# Patient Record
Sex: FEMALE | Race: BLACK OR AFRICAN AMERICAN | NOT HISPANIC OR LATINO | Employment: FULL TIME | URBAN - METROPOLITAN AREA
[De-identification: names, ages, dates, MRNs, and addresses within clinical notes are randomized per-mention and may not be internally consistent; named-entity substitution may affect disease eponyms.]

---

## 2017-02-07 ENCOUNTER — OFFICE VISIT (OUTPATIENT)
Dept: FAMILY MEDICINE | Facility: CLINIC | Age: 27
End: 2017-02-07
Payer: COMMERCIAL

## 2017-02-07 VITALS
BODY MASS INDEX: 47.09 KG/M2 | OXYGEN SATURATION: 100 % | TEMPERATURE: 98 F | HEART RATE: 116 BPM | SYSTOLIC BLOOD PRESSURE: 120 MMHG | WEIGHT: 293 LBS | HEIGHT: 66 IN | DIASTOLIC BLOOD PRESSURE: 70 MMHG | RESPIRATION RATE: 16 BRPM

## 2017-02-07 DIAGNOSIS — F41.9 ANXIETY: ICD-10-CM

## 2017-02-07 DIAGNOSIS — F41.9 ANXIETY: Primary | ICD-10-CM

## 2017-02-07 PROCEDURE — 99999 PR PBB SHADOW E&M-EST. PATIENT-LVL III: CPT | Mod: PBBFAC,,, | Performed by: FAMILY MEDICINE

## 2017-02-07 PROCEDURE — 99203 OFFICE O/P NEW LOW 30 MIN: CPT | Mod: S$GLB,,, | Performed by: FAMILY MEDICINE

## 2017-02-07 RX ORDER — FLUTICASONE PROPIONATE 50 MCG
SPRAY, SUSPENSION (ML) NASAL
Refills: 0 | COMMUNITY
Start: 2017-01-13 | End: 2017-02-07

## 2017-02-07 RX ORDER — FLUOXETINE HYDROCHLORIDE 20 MG/1
20 CAPSULE ORAL DAILY
Qty: 30 CAPSULE | Refills: 1 | Status: SHIPPED | OUTPATIENT
Start: 2017-02-07 | End: 2017-02-07 | Stop reason: SDUPTHER

## 2017-02-07 RX ORDER — AMOXICILLIN 875 MG/1
TABLET, FILM COATED ORAL
Refills: 0 | COMMUNITY
Start: 2016-11-22 | End: 2017-02-07

## 2017-02-07 RX ORDER — ETONOGESTREL AND ETHINYL ESTRADIOL .12; .015 MG/D; MG/D
INSERT, EXTENDED RELEASE VAGINAL
Refills: 12 | COMMUNITY
Start: 2016-11-11

## 2017-02-07 RX ORDER — HYDROXYZINE PAMOATE 25 MG/1
25 CAPSULE ORAL
COMMUNITY
End: 2018-02-22

## 2017-02-07 NOTE — MR AVS SNAPSHOT
[facility] Medicine  [address]  [place] [address]  Phone: [phone]  Fax: [phone]                  Lisa Martinez   2017 2:20 PM   Office Visit    Description:  Female : 1990   Provider:  Barrera Rosenberg MD   Department:  [facility]           Reason for Visit     Breast Pain           Diagnoses this Visit        Comments    Anxiety    -  Primary            To Do List           Goals (5 Years of Data)     None       These Medications        Disp Refills Start End    fluoxetine (PROZAC) 20 MG capsule 30 capsule 1 2017     Take 1 capsule (20 mg total) by mouth once daily. - Oral    Pharmacy: [id number] - [place] [address] AT [facility] #: [phone]         [facility] On Call     [facility] On Call Nurse Care Line -  Assistance  Registered nurses in the [facility] On Call Center provide clinical advisement, health education, appointment booking, and other advisory services.  Call for this free service at [phone].             Medications           Message regarding Medications     Verify the changes and/or additions to your medication regime listed below are the same as discussed with your clinician today.  If any of these changes or additions are incorrect, please notify your healthcare provider.        START taking these NEW medications        Refills    fluoxetine (PROZAC) 20 MG capsule 1    Sig: Take 1 capsule (20 mg total) by mouth once daily.    Class: Normal    Route: Oral      STOP taking these medications     amoxicillin (AMOXIL) 875 MG tablet TK 1 T PO Q 12 H TAT    diclofenac sodium (SOLARAZE) 3 % gel Apply topically 2 (two) times daily.      fluticasone (FLONASE) 50 mcg/actuation nasal spray SPRAY 1 SPRAY IEN BID OR AS AN ALTERNATIVE 2 SPRAYS IEN ONCE QD    ibuprofen (ADVIL,MOTRIN) 600 MG tablet Take 1 tablet (600 mg total) by mouth every 6 (six) hours as needed for Pain.     "omeprazole (PRILOSEC) 20 MG capsule Take 1 capsule (20 mg total) by mouth once daily.           Verify that the below list of medications is an accurate representation of the medications you are currently taking.  If none reported, the list may be blank. If incorrect, please contact your healthcare provider. Carry this list with you in case of emergency.           Current Medications     hydrOXYzine pamoate (VISTARIL) 25 MG Cap Take 25 mg by mouth. TAKE 1 CAPSULE BY MOUTH EVERY 4 TO 6 HOURS AS NEEDED FOR NAUSEA    fluoxetine (PROZAC) 20 MG capsule Take 1 capsule (20 mg total) by mouth once daily.    NUVARING 0.12-0.015 mg/24 hr vaginal ring INSERT 1 RING VAGINALLY UTD FOR 1 MONTH           Clinical Reference Information           Your Vitals Were     BP Pulse Temp Resp Height Weight    120/70 (BP Location: Left arm, Patient Position: Sitting, BP Method: Manual) 116 98.1 °F (36.7 °C) (Oral) 16 5' 6" (1.676 m) 135.5 kg (298 lb 11.6 oz)    Last Period SpO2 BMI          02/01/2017 (Exact Date) 100% 48.22 kg/m2        Blood Pressure          Most Recent Value    BP  120/70      Allergies as of 2/7/2017     Shellfish Containing Products    Shellfish Derived      Immunizations Administered on Date of Encounter - 2/7/2017     None      Instructions      Your Bodys Response to Anxiety    Normal anxiety is part of the bodys natural defense system. It's an alert to a threat that is unknown, vague, or comes from your own internal fears. While youre in this state, your feelings can range from a vague sense of worry to physical sensations such as a pounding heartbeat. These feelings make you want to react to the threat. An anxiety response is normal in many situations. But when you have an anxiety disorder, the same response can occur at the wrong times.  Anxiety can be helpful  Normal anxiety is a signal from your brain that warns you of a threat and is a normal response to help you prevent something or decrease the bad " effects of something you can't control. For example, anxiety is a normal response to situations that might damage your body, separate you from a loved one, or lose your job. The symptoms of anxiety can be physical and mental.  How does it feel?  At certain times, people with anxiety may have:  · Dizziness  · Muscle tension or pain  · Restlessness  · Sleeplessness  · Difficulty concentrating  · Racing heartbeat  · Fast breathing  · Shaking or trembling  · Stomachache  · Diarrhea  · Loss of energy  · Sweating  · Cold, clammy hands  · Chest pain  · Dry mouth  Anxiety can also be a problem  Anxiety can become a problem when it is difficult to control, occurs for months, and interferes with important parts of your life. With an anxiety disorder, your body has the response described above, but in inappropriate ways. The response a person has depends on the anxiety disorder he or she has. With some disorders, the anxiety is way out of proportion to the threat that triggers it. With others, anxiety may occur even when there isnt a clear threat or trigger.  Who does it affect?  Some people are more prone to persistent anxiety than others. It tends to run in families, and it affects more younger people than older people. But no age, race, or gender is immune to anxiety problems.  Anxiety can be treated  The good news is that the anxiety thats disrupting your life can be treated. Working with your doctor or other healthcare provider, you can develop skills to help you cope with anxiety. You can also gain the perspective you need to overcome your fears. Note: Good sources of support or guidance can be found at your local hospital, mental health clinic, or an employee assistance program.     If anxiety is wearing you down, here are some things you can do to cope:  · Keep in mind that you cant control everything about a situation. Change what you can and let the rest take its course.  · Exercise--its a great way to relieve  tension and help your body feel relaxed.  · Avoid caffeine and nicotine, which can make anxiety symptoms worse.  · Fight the temptation to turn to alcohol or unprescribed drugs for relief. They only make things worse in the long run.   Date Last Reviewed: 1/19/2015 © 2000-2016 [employer]. [address] [place] [address], [place] [address]. All rights reserved. This information is not intended as a substitute for professional medical care. Always follow your healthcare professional's instructions.        Anxiety Reaction  Anxiety is the feeling we all get when we think something bad might happen. It is a normal response to stress and usually causes only a mild reaction. When anxiety becomes more severe, it can interfere with daily life. In some cases, you may not even be aware of what it is youre anxious about. There may also be a genetic link or it may be a learned behavior in the home.  Both psychological and physical triggers cause stress reaction. It's often a response to fear or emotional stress, real or imagined. This stress may come from home, family, work, or social relationships.  During an anxiety reaction, you may feel:  · Helpless  · Nervous  · Depressed  · Irritable  Your body may show signs of anxiety in many ways. You may experience:  · Dry mouth  · Shakiness  · Dizziness  · Weakness  · Trouble breathing  · Breathing fast (hyperventilating)  · Chest pressure  · Sweating  · Headache  · Nausea  · Diarrhea  · Tiredness  · Inability to sleep  · Sexual problems  Home care  · Try to locate the sources of stress in your life. They may not be obvious. These may include:  ¨ Daily hassles of life (traffic jams, missed appointments, car troubles, etc.)  ¨ Major life changes, both good (new baby, job promotion) and bad (loss of job, loss of loved one)  ¨ Overload: feeling that you have too many responsibilities and can't take care of all of them at once  ¨ Feeling helpless, feeling that your problems are  beyond what youre able to solve  · Notice how your body reacts to stress. Learn to listen to your body signals. This will help you take action before the stress becomes severe.  · When you can, do something about the source of your stress. (Avoid hassles, limit the amount of change that happens in your life at one time and take a break when you feel overloaded).  · Unfortunately, many stressful situations can't be avoided. It is necessary to learn how to better manage stress. There are many proven methods that will reduce your anxiety. These include simple things like exercise, good nutrition and adequate rest. Also, there are certain techniques that are helpful:  ¨ Relaxation  ¨ Breathing exercises  ¨ Visualization  ¨ Biofeedback  ¨ Meditation  For more information about this, consult your doctor or go to a local bookstore and review the many books and tapes available on this subject.  Follow-up care  If you feel that your anxiety is not responding to self-help measures, contact your doctor or make an appointment with a counselor. You may need short-term psychological counseling and temporary medicine to help you manage stress.  Call 911  Call your healthcare provider right away if any of these occur:  · Trouble breathing  · Confusion  · Drowsiness or trouble wakening  · Fainting or loss of consciousness  · Rapid heart rate  · Seizure  · New chest pain that becomes more severe, lasts longer, or spreads into your shoulder, arm, neck, jaw, or back  When to seek medical advice  Call your healthcare provider right away if any of these occur:  · Your symptoms get worse  · Severe headache not relieved by rest and mild pain reliever  Date Last Reviewed: 9/29/2015  © 2029-6258 [employer]. [address] [place] [address], [place] [address]. All rights reserved. This information is not intended as a substitute for professional medical care. Always follow your healthcare professional's instructions.              Language Assistance Services     ATTENTION: Language assistance services are available, free of charge. Please call [phone].      ATENCIÓN: Si habla familia, tiene a salazar disposición servicios gratuitos de asistencia lingüística. Llame al [phone].     CHÚ Ý: N?u b?n nói Ti?ng Vi?t, có các d?ch v? h? tr? ngôn ng? mi?n phí dành cho b?n. G?i s? [phone].         [facility] complies with applicable Federal civil rights laws and does not discriminate on the basis of race, color, national origin, age, disability, or sex.

## 2017-02-07 NOTE — MEDICAL/APP STUDENT
Subjective:       Patient ID: Lisa Martinez is a 26 y.o. female.    Chief Complaint: Breast Pain (left breast off and on for past 2 months )    HPI   Complains of breast pain lasting for few seconds at a time in the upper outer quadrant and centrally. Has happened 4 times in the last two months. Most recently felt constant pain while doing laundry for a few seconds, also felt hot and had to sit down. Pain did not radiate, no SOB, no sweating. Feels symptoms become worse with worry and fixating on a possible illness. Meditates at night and does deep breathing exercises to relax. Recently engaged 9 months ago and has been planning wedding, feels like she is running short on time and may be the cause of some of her symptoms. Denies other symptoms     Was seen at [facility] for same complaint and weakness of left arm. Given clonazepam and symptoms went away.   Has intentional weight loss -32 lbs / 3.5 months  Has not been refitted for a new bra since    GAD7  Nervous every day last two weeks +3  Not been able to stop worrying +3  Worrying too much about different things +3  Trouble relaxing +1  Being so restless, that's it's hard to sit still +1  Being easily annoyed or irritable +2  Feeling afraid as if something awful might happen +3  Total = 16  Very difficult to deal with work, things at home, social interactions     No SOB, CP, STEEL, LOC    Review of Systems   Constitutional: Negative for chills, diaphoresis and fever.   Respiratory: Negative for cough, chest tightness, shortness of breath and wheezing.    Cardiovascular: Negative for chest pain and palpitations.   Gastrointestinal: Negative for constipation, diarrhea, nausea and vomiting.   Neurological: Negative for dizziness, syncope and light-headedness.       Objective:      Physical Exam   Constitutional: She is oriented to person, place, and time. She appears well-developed and well-nourished.   Cardiovascular: Regular rhythm.  Tachycardia present.     Pulmonary/Chest: Effort normal and breath sounds normal.   Neurological: She is alert and oriented to person, place, and time.   Psychiatric: Her mood appears anxious.   Tearful       Assessment:       No diagnosis found.    Plan:       Anxiety Disorder  She said she has a history of anxiety and had gone though some training in the past to deal with it. Is engaged and has been planning wedding. Felt within the last two months that she is running out of time and coincides with onset of symptoms. Went to [facility] for similar CC and given clonazepam and reports symptoms went away.     Tearful during evaluation  HOMERO-7 +16/21 - Severe.  - Rec Breathing exercising, taking breaks to settle self  - CBT  - Exercise  - Sertraline

## 2017-02-07 NOTE — PROGRESS NOTES
Subjective:       Patient ID: Lisa Martinez is a 26 y.o. female.    Chief Complaint: Breast Pain (left breast off and on for past 2 months )    HPI      Complains of central chest pain that radiates to the L side x 2 months that is intermittent, and lasts on the order of seconds. She has had 4 such episodes. Most recently felt chest pain while doing laundry for a few seconds, a/w sensation of 'feeling hot' and had to sit down. Her pain quickly resolved. Pt states that symptoms become worse with worry and fixating on a possible illness. Meditates at night and does deep breathing exercises to relax. She became engaged 9 months ago and has been planning wedding which is occuringin 2 months, which is a significant source of section.      She was seen at [facility] for same complaint and weakness of left arm. Given clonazepam and symptoms went away.   Has intentional weight loss -32 lbs / 3.5 months  Has not been refitted for a new bra since     GAD7  Nervous every day last two weeks +3  Not been able to stop worrying +3  Worrying too much about different things +3  Trouble relaxing +1  Being so restless, that's it's hard to sit still +1  Being easily annoyed or irritable +2  Feeling afraid as if something awful might happen +3  Total = 16  Very difficult to deal with work, things at home, social interactions      No SOB, CP, STEEL, LOC         Anxiety - pt has had anxiety in the past.         Pt has mom and sister with anxiety disorders.        No current outpatient prescriptions on file prior to visit.     No current facility-administered medications on file prior to visit.        Review of Systems   Constitutional: Negative for chills and fever.   Respiratory: Negative for shortness of breath and wheezing.        Objective:     Vitals:    02/07/17 1429   BP: 120/70   Pulse: (!) 116   Resp: 16   Temp: 98.1 °F (36.7 °C)        Physical Exam   Constitutional: She appears well-developed. No distress.   LUI POE:    Head: Normocephalic and atraumatic.   Eyes: Conjunctivae are normal. No scleral icterus.   Pulmonary/Chest: Effort normal.   Neurological: She is alert.   Skin: She is not diaphoretic.   Psychiatric: She has a normal mood and affect. Her behavior is normal.   Vitals reviewed.      Assessment:       1. Anxiety        Plan:       Lisa was seen today for breast pain.    Diagnoses and all orders for this visit:    Anxiety _ NEW to me, chronic problem    Pt counseled on potential avenues to treat their anxiety/depression including medication, CBT, and exercise.    Pt opted for trial of medications. I explained potential side effects including worsening mood or SI, and instructed the patient to stop the medication immediately and to contact our office if these sxs occur.    -  I also explained that SSRIs/SNRIs that are used to treat anxiety/depression, take up to 8 weeks for full efficacy and I encouraged compliance through this period.     - I also advised patient that once medication was started, that it should not be stopped abruptly. Pt asked to notify me if they want to stop themedication for any reason so that I can explain how to wean off of the medication safely.      Pt has opted for a trial of CBT. Pt was instructed to call their insurance company and request an appointment to be made for CBT as a referral is not needed.     Pt has opted for a trial of 3-5 days a week of cardio exercise for 20-30mins to help control their sxs.               Return in about 2 weeks (around 2/21/2017) for Anxiety.        Pt verbalized understanding and agreed with our plan.

## 2017-02-07 NOTE — PATIENT INSTRUCTIONS
Your Bodys Response to Anxiety    Normal anxiety is part of the bodys natural defense system. It's an alert to a threat that is unknown, vague, or comes from your own internal fears. While youre in this state, your feelings can range from a vague sense of worry to physical sensations such as a pounding heartbeat. These feelings make you want to react to the threat. An anxiety response is normal in many situations. But when you have an anxiety disorder, the same response can occur at the wrong times.  Anxiety can be helpful  Normal anxiety is a signal from your brain that warns you of a threat and is a normal response to help you prevent something or decrease the bad effects of something you can't control. For example, anxiety is a normal response to situations that might damage your body, separate you from a loved one, or lose your job. The symptoms of anxiety can be physical and mental.  How does it feel?  At certain times, people with anxiety may have:  · Dizziness  · Muscle tension or pain  · Restlessness  · Sleeplessness  · Difficulty concentrating  · Racing heartbeat  · Fast breathing  · Shaking or trembling  · Stomachache  · Diarrhea  · Loss of energy  · Sweating  · Cold, clammy hands  · Chest pain  · Dry mouth  Anxiety can also be a problem  Anxiety can become a problem when it is difficult to control, occurs for months, and interferes with important parts of your life. With an anxiety disorder, your body has the response described above, but in inappropriate ways. The response a person has depends on the anxiety disorder he or she has. With some disorders, the anxiety is way out of proportion to the threat that triggers it. With others, anxiety may occur even when there isnt a clear threat or trigger.  Who does it affect?  Some people are more prone to persistent anxiety than others. It tends to run in families, and it affects more younger people than older people. But no age, race, or gender is immune  to anxiety problems.  Anxiety can be treated  The good news is that the anxiety thats disrupting your life can be treated. Working with your doctor or other healthcare provider, you can develop skills to help you cope with anxiety. You can also gain the perspective you need to overcome your fears. Note: Good sources of support or guidance can be found at your local hospital, mental health clinic, or an employee assistance program.     If anxiety is wearing you down, here are some things you can do to cope:  · Keep in mind that you cant control everything about a situation. Change what you can and let the rest take its course.  · Exercise--its a great way to relieve tension and help your body feel relaxed.  · Avoid caffeine and nicotine, which can make anxiety symptoms worse.  · Fight the temptation to turn to alcohol or unprescribed drugs for relief. They only make things worse in the long run.   Date Last Reviewed: 1/19/2015  © 6322-2761 [employer]. [address] [place] [address]. All rights reserved. This information is not intended as a substitute for professional medical care. Always follow your healthcare professional's instructions.        Anxiety Reaction  Anxiety is the feeling we all get when we think something bad might happen. It is a normal response to stress and usually causes only a mild reaction. When anxiety becomes more severe, it can interfere with daily life. In some cases, you may not even be aware of what it is youre anxious about. There may also be a genetic link or it may be a learned behavior in the home.  Both psychological and physical triggers cause stress reaction. It's often a response to fear or emotional stress, real or imagined. This stress may come from home, family, work, or social relationships.  During an anxiety reaction, you may feel:  · Helpless  · Nervous  · Depressed  · Irritable  Your body may show signs of anxiety in many ways. You may  experience:  · Dry mouth  · Shakiness  · Dizziness  · Weakness  · Trouble breathing  · Breathing fast (hyperventilating)  · Chest pressure  · Sweating  · Headache  · Nausea  · Diarrhea  · Tiredness  · Inability to sleep  · Sexual problems  Home care  · Try to locate the sources of stress in your life. They may not be obvious. These may include:  ¨ Daily hassles of life (traffic jams, missed appointments, car troubles, etc.)  ¨ Major life changes, both good (new baby, job promotion) and bad (loss of job, loss of loved one)  ¨ Overload: feeling that you have too many responsibilities and can't take care of all of them at once  ¨ Feeling helpless, feeling that your problems are beyond what youre able to solve  · Notice how your body reacts to stress. Learn to listen to your body signals. This will help you take action before the stress becomes severe.  · When you can, do something about the source of your stress. (Avoid hassles, limit the amount of change that happens in your life at one time and take a break when you feel overloaded).  · Unfortunately, many stressful situations can't be avoided. It is necessary to learn how to better manage stress. There are many proven methods that will reduce your anxiety. These include simple things like exercise, good nutrition and adequate rest. Also, there are certain techniques that are helpful:  ¨ Relaxation  ¨ Breathing exercises  ¨ Visualization  ¨ Biofeedback  ¨ Meditation  For more information about this, consult your doctor or go to a local bookstore and review the many books and tapes available on this subject.  Follow-up care  If you feel that your anxiety is not responding to self-help measures, contact your doctor or make an appointment with a counselor. You may need short-term psychological counseling and temporary medicine to help you manage stress.  Call 911  Call your healthcare provider right away if any of these occur:  · Trouble  breathing  · Confusion  · Drowsiness or trouble wakening  · Fainting or loss of consciousness  · Rapid heart rate  · Seizure  · New chest pain that becomes more severe, lasts longer, or spreads into your shoulder, arm, neck, jaw, or back  When to seek medical advice  Call your healthcare provider right away if any of these occur:  · Your symptoms get worse  · Severe headache not relieved by rest and mild pain reliever  Date Last Reviewed: 9/29/2015 © 2000-2016 [employer]. [address] [place] [address]. All rights reserved. This information is not intended as a substitute for professional medical care. Always follow your healthcare professional's instructions.

## 2017-02-09 RX ORDER — FLUOXETINE HYDROCHLORIDE 20 MG/1
CAPSULE ORAL
Qty: 90 CAPSULE | Refills: 1 | Status: SHIPPED | OUTPATIENT
Start: 2017-02-09 | End: 2018-02-12 | Stop reason: SDUPTHER

## 2018-02-12 DIAGNOSIS — F41.9 ANXIETY: ICD-10-CM

## 2018-02-12 RX ORDER — FLUOXETINE HYDROCHLORIDE 20 MG/1
20 CAPSULE ORAL DAILY
Qty: 30 CAPSULE | Refills: 0 | Status: SHIPPED | OUTPATIENT
Start: 2018-02-12 | End: 2018-03-16

## 2018-02-12 RX ORDER — FLUOXETINE HYDROCHLORIDE 20 MG/1
20 CAPSULE ORAL DAILY
Qty: 30 CAPSULE | Refills: 0 | Status: SHIPPED | OUTPATIENT
Start: 2018-02-12 | End: 2018-02-12 | Stop reason: SDUPTHER

## 2018-02-12 NOTE — TELEPHONE ENCOUNTER
Prozac refill. Last refill.02/09/2017 LOV 02/07/2017    ----- Message from Katie Yip sent at 2/12/2018 12:22 PM CST -----  Contact: Self/[phone]  Refill:  fluoxetine (PROZAC) 20 MG capsule    Pharmacy:  [facility] [address] [place] [address] AT [facility]. Thank you.

## 2018-02-22 ENCOUNTER — TELEPHONE (OUTPATIENT)
Dept: FAMILY MEDICINE | Facility: CLINIC | Age: 28
End: 2018-02-22

## 2018-02-22 ENCOUNTER — OFFICE VISIT (OUTPATIENT)
Dept: FAMILY MEDICINE | Facility: CLINIC | Age: 28
End: 2018-02-22
Payer: COMMERCIAL

## 2018-02-22 ENCOUNTER — LAB VISIT (OUTPATIENT)
Dept: LAB | Facility: HOSPITAL | Age: 28
End: 2018-02-22
Attending: FAMILY MEDICINE
Payer: COMMERCIAL

## 2018-02-22 VITALS
OXYGEN SATURATION: 99 % | TEMPERATURE: 99 F | BODY MASS INDEX: 47.09 KG/M2 | RESPIRATION RATE: 16 BRPM | SYSTOLIC BLOOD PRESSURE: 120 MMHG | DIASTOLIC BLOOD PRESSURE: 70 MMHG | HEART RATE: 95 BPM | WEIGHT: 293 LBS | HEIGHT: 66 IN

## 2018-02-22 DIAGNOSIS — F41.9 ANXIETY: ICD-10-CM

## 2018-02-22 DIAGNOSIS — R22.1 NECK FULLNESS: Primary | ICD-10-CM

## 2018-02-22 DIAGNOSIS — E66.01 MORBID OBESITY WITH BMI OF 40.0-44.9, ADULT: ICD-10-CM

## 2018-02-22 DIAGNOSIS — R22.1 NECK FULLNESS: ICD-10-CM

## 2018-02-22 DIAGNOSIS — Z00.00 ANNUAL PHYSICAL EXAM: ICD-10-CM

## 2018-02-22 DIAGNOSIS — Z00.00 ANNUAL PHYSICAL EXAM: Primary | ICD-10-CM

## 2018-02-22 LAB
ALBUMIN SERPL BCP-MCNC: 3.7 G/DL
ALP SERPL-CCNC: 94 U/L
ALT SERPL W/O P-5'-P-CCNC: 12 U/L
ANION GAP SERPL CALC-SCNC: 8 MMOL/L
AST SERPL-CCNC: 13 U/L
BILIRUB SERPL-MCNC: 1.3 MG/DL
BUN SERPL-MCNC: 11 MG/DL
CALCIUM SERPL-MCNC: 9.3 MG/DL
CHLORIDE SERPL-SCNC: 104 MMOL/L
CHOLEST SERPL-MCNC: 167 MG/DL
CHOLEST/HDLC SERPL: 3.5 {RATIO}
CO2 SERPL-SCNC: 26 MMOL/L
CREAT SERPL-MCNC: 0.9 MG/DL
EST. GFR  (AFRICAN AMERICAN): >60 ML/MIN/1.73 M^2
EST. GFR  (NON AFRICAN AMERICAN): >60 ML/MIN/1.73 M^2
ESTIMATED AVG GLUCOSE: 97 MG/DL
GLUCOSE SERPL-MCNC: 91 MG/DL
HBA1C MFR BLD HPLC: 5 %
HDLC SERPL-MCNC: 48 MG/DL
HDLC SERPL: 28.7 %
LDLC SERPL CALC-MCNC: 103.6 MG/DL
NONHDLC SERPL-MCNC: 119 MG/DL
POTASSIUM SERPL-SCNC: 4 MMOL/L
PROT SERPL-MCNC: 7.5 G/DL
SODIUM SERPL-SCNC: 138 MMOL/L
TRIGL SERPL-MCNC: 77 MG/DL
TSH SERPL DL<=0.005 MIU/L-ACNC: 1.13 UIU/ML

## 2018-02-22 PROCEDURE — 84443 ASSAY THYROID STIM HORMONE: CPT

## 2018-02-22 PROCEDURE — 83036 HEMOGLOBIN GLYCOSYLATED A1C: CPT

## 2018-02-22 PROCEDURE — 36415 COLL VENOUS BLD VENIPUNCTURE: CPT | Mod: PO

## 2018-02-22 PROCEDURE — 80053 COMPREHEN METABOLIC PANEL: CPT

## 2018-02-22 PROCEDURE — 99999 PR PBB SHADOW E&M-EST. PATIENT-LVL III: CPT | Mod: PBBFAC,,, | Performed by: FAMILY MEDICINE

## 2018-02-22 PROCEDURE — 3008F BODY MASS INDEX DOCD: CPT | Mod: S$GLB,,, | Performed by: FAMILY MEDICINE

## 2018-02-22 PROCEDURE — 99214 OFFICE O/P EST MOD 30 MIN: CPT | Mod: S$GLB,,, | Performed by: FAMILY MEDICINE

## 2018-02-22 PROCEDURE — 80061 LIPID PANEL: CPT

## 2018-02-22 NOTE — PROGRESS NOTES
Subjective:       Patient ID: Lisa Martinez is a 27 y.o. female.    Chief Complaint: Anxiety    HPI    Anxiety    Pts anxiety has been worse than usual lately, she was not taking the fluoxetine until 1 week ago.     She did take the medication consistently for the first month, and maybe felt a bit better, but then stop.     Neck swelling - onset years ago - has been evaluated before and she has normal thyroid function.       Outpatient Prescriptions Marked as Taking for the 2/22/18 encounter (Office Visit) with Barrera Rosenberg MD   Medication Sig Dispense Refill    FLUoxetine (PROZAC) 20 MG capsule Take 1 capsule (20 mg total) by mouth once daily. Refill requires an appointment 30 capsule 0    NUVARING 0.12-0.015 mg/24 hr vaginal ring INSERT 1 RING VAGINALLY UTD FOR 1 MONTH  12       Past Medical History:   Diagnosis Date    Heart burn        Family History   Problem Relation Age of Onset    Diabetes Father     Hypertension Father     Diabetes          reports that she has quit smoking. Her smoking use included Cigarettes. She has never used smokeless tobacco. She reports that she drinks alcohol. She reports that she does not use drugs.    Review of Systems   Constitutional: Negative for chills and fever.   Psychiatric/Behavioral: Negative for dysphoric mood. The patient is nervous/anxious.        Objective:     Vitals:    02/22/18 1339   BP: 120/70   Pulse: 95   Resp: 16   Temp: 98.9 °F (37.2 °C)        Physical Exam   Constitutional: She appears well-developed. No distress.   HENT:   Head: Normocephalic and atraumatic.   Eyes: Conjunctivae are normal. No scleral icterus.   Pulmonary/Chest: Effort normal.   Neurological: She is alert.   Skin: She is not diaphoretic.   Psychiatric: She has a normal mood and affect. Her behavior is normal.   Vitals reviewed.      Assessment:       1. Neck fullness    2. Anxiety    3. Morbid obesity with BMI of 40.0-44.9, adult        Plan:       Lisa was seen today  for anxiety.    Diagnoses and all orders for this visit:    Neck fullness - subsequent encounter, new to me  -     TSH; Future  -     US Soft Tissue Head Neck Thyroid; Future  Possible thryomegaly vs fat deposit.     Anxiety  We'll continue current dose. Patient is without side effects at this time. She'll notify me if she developed side effects or if this treatment isn't effective  Sertraline as her next best bet. Prozac causes side effects.    Morbid obesity with BMI of 40.0-44.9, adult    We discussed weight loss techniques in detail today including dietary changes and activity level changes to include consistent exercise. Patient is aware of the need for portion control and to eliminate empty calories such as she regretted drinks etc.        Follow-up in about 4 weeks (around 3/22/2018) for Annual Physical.        Pt verbalized understanding and agreed with our plan.

## 2018-02-23 ENCOUNTER — TELEPHONE (OUTPATIENT)
Dept: FAMILY MEDICINE | Facility: CLINIC | Age: 28
End: 2018-02-23

## 2018-02-23 NOTE — TELEPHONE ENCOUNTER
----- Message from Barrera Rosenberg MD sent at 2/23/2018  8:09 AM CST -----  Please notify the patient that their results are grossly within an acceptable range, but their bilirubin is a little elevated. There is nothing for them to do concerning this in the at this time.  We will discuss more thoroughly at their next appt. Please make sure that they have a follow up within the next 3 months.  Thanks.

## 2018-03-07 ENCOUNTER — HOSPITAL ENCOUNTER (OUTPATIENT)
Dept: RADIOLOGY | Facility: HOSPITAL | Age: 28
Discharge: HOME | End: 2018-03-07
Attending: FAMILY MEDICINE
Payer: COMMERCIAL

## 2018-03-07 DIAGNOSIS — R22.1 NECK FULLNESS: ICD-10-CM

## 2018-03-07 PROCEDURE — 76536 US EXAM OF HEAD AND NECK: CPT | Mod: TC

## 2018-03-07 PROCEDURE — 76536 US EXAM OF HEAD AND NECK: CPT | Mod: 26,,, | Performed by: RADIOLOGY

## 2018-03-07 NOTE — PROGRESS NOTES
Please notify patient results are normal! Please have pt follow up or contact me if they have any questions. Follow up as previously discussed. Thanks.

## 2018-03-08 ENCOUNTER — TELEPHONE (OUTPATIENT)
Dept: FAMILY MEDICINE | Facility: CLINIC | Age: 28
End: 2018-03-08

## 2018-03-08 NOTE — TELEPHONE ENCOUNTER
Informed pt of results below. Pt verbally understood        ----- Message from Barrera Rosenberg MD sent at 3/7/2018  5:15 PM CST -----  Please notify patient results are normal! Please have pt follow up or contact me if they have any questions. Follow up as previously discussed. Thanks.

## 2018-03-15 DIAGNOSIS — F41.9 ANXIETY: ICD-10-CM

## 2018-03-16 RX ORDER — FLUOXETINE HYDROCHLORIDE 20 MG/1
CAPSULE ORAL
Qty: 30 CAPSULE | Refills: 0 | Status: SHIPPED | OUTPATIENT
Start: 2018-03-16 | End: 2018-05-28 | Stop reason: SDUPTHER

## 2018-05-28 DIAGNOSIS — F41.9 ANXIETY: ICD-10-CM

## 2018-05-29 RX ORDER — FLUOXETINE HYDROCHLORIDE 20 MG/1
CAPSULE ORAL
Qty: 30 CAPSULE | Refills: 5 | Status: SHIPPED | OUTPATIENT
Start: 2018-05-29

## 2018-11-02 ENCOUNTER — OFFICE VISIT (OUTPATIENT)
Dept: URGENT CARE | Facility: CLINIC | Age: 28
End: 2018-11-02
Payer: COMMERCIAL

## 2018-11-02 VITALS
HEART RATE: 97 BPM | HEIGHT: 66 IN | TEMPERATURE: 98 F | BODY MASS INDEX: 47.09 KG/M2 | OXYGEN SATURATION: 98 % | WEIGHT: 293 LBS

## 2018-11-02 DIAGNOSIS — R21 RASH AND NONSPECIFIC SKIN ERUPTION: Primary | ICD-10-CM

## 2018-11-02 PROCEDURE — 96372 THER/PROPH/DIAG INJ SC/IM: CPT | Mod: S$GLB,,, | Performed by: FAMILY MEDICINE

## 2018-11-02 PROCEDURE — 3008F BODY MASS INDEX DOCD: CPT | Mod: CPTII,S$GLB,, | Performed by: FAMILY MEDICINE

## 2018-11-02 PROCEDURE — 99214 OFFICE O/P EST MOD 30 MIN: CPT | Mod: 25,S$GLB,, | Performed by: FAMILY MEDICINE

## 2018-11-02 RX ORDER — BETAMETHASONE SODIUM PHOSPHATE AND BETAMETHASONE ACETATE 3; 3 MG/ML; MG/ML
9 INJECTION, SUSPENSION INTRA-ARTICULAR; INTRALESIONAL; INTRAMUSCULAR; SOFT TISSUE
Status: COMPLETED | OUTPATIENT
Start: 2018-11-02 | End: 2018-11-02

## 2018-11-02 RX ORDER — PREDNISONE 20 MG/1
20 TABLET ORAL DAILY
Qty: 5 TABLET | Refills: 0 | Status: SHIPPED | OUTPATIENT
Start: 2018-11-02 | End: 2018-11-07

## 2018-11-02 RX ORDER — TRIAMCINOLONE ACETONIDE 1 MG/G
CREAM TOPICAL 2 TIMES DAILY
Qty: 45 G | Refills: 0 | Status: SHIPPED | OUTPATIENT
Start: 2018-11-02 | End: 2018-11-12

## 2018-11-02 RX ADMIN — BETAMETHASONE SODIUM PHOSPHATE AND BETAMETHASONE ACETATE 9 MG: 3; 3 INJECTION, SUSPENSION INTRA-ARTICULAR; INTRALESIONAL; INTRAMUSCULAR; SOFT TISSUE at 04:11

## 2018-11-02 NOTE — PROGRESS NOTES
"Subjective:       Patient ID: Lisa Martinez is a 28 y.o. female.    Vitals:  height is 5' 6" (1.676 m) and weight is 133.4 kg (294 lb). Her oral temperature is 98 °F (36.7 °C). Her pulse is 97. Her oxygen saturation is 98%.     Chief Complaint: Rash    Rash   This is a new problem. The current episode started in the past 7 days (Wed. ). The problem is unchanged. The affected locations include the chest, back, left arm, left elbow, left shoulder, right shoulder, right arm, right elbow, right wrist, left wrist, left hand, right hand, right hip and left hip. The rash is characterized by dryness, itchiness, redness and scaling. She was exposed to a new detergent/soap. Pertinent negatives include no fever, joint pain, shortness of breath or sore throat. Past treatments include moisturizer. The treatment provided no relief.     Review of Systems   Constitution: Negative for chills and fever.   HENT: Negative for sore throat.    Respiratory: Negative for shortness of breath.    Skin: Positive for dry skin, flushing, itching and rash.   Musculoskeletal: Negative for joint pain.       Objective:      Physical Exam   Constitutional: She is oriented to person, place, and time. She appears well-developed and well-nourished.   HENT:   Head: Normocephalic and atraumatic.   Right Ear: External ear normal.   Left Ear: External ear normal.   Mouth/Throat: Oropharynx is clear and moist.   Eyes: EOM are normal. Pupils are equal, round, and reactive to light.   Neck: Normal range of motion. Neck supple. No JVD present. No tracheal deviation present. No thyromegaly present.   Cardiovascular: Normal rate, regular rhythm and normal heart sounds. Exam reveals no gallop and no friction rub.   No murmur heard.  Pulmonary/Chest: Breath sounds normal. No respiratory distress. She has no wheezes. She has no rales. She exhibits no tenderness.   Abdominal: Soft. Bowel sounds are normal. She exhibits no distension and no mass. There is no " tenderness. There is no rebound and no guarding. No hernia.   Musculoskeletal: Normal range of motion. She exhibits no edema, tenderness or deformity.   Lymphadenopathy:     She has no cervical adenopathy.   Neurological: She is alert and oriented to person, place, and time. She displays normal reflexes. No cranial nerve deficit. She exhibits normal muscle tone. Coordination normal.   Skin: Skin is warm. Capillary refill takes less than 2 seconds.   Erythematous, papular, mildly elevated rash on the arms, legs, neck, chest.   Psychiatric: She has a normal mood and affect. Her behavior is normal. Judgment and thought content normal.   Vitals reviewed.      Assessment:       1. Rash and nonspecific skin eruption        Plan:         Rash and nonspecific skin eruption  -     betamethasone acetate-betamethasone sodium phosphate injection 9 mg  -     predniSONE (DELTASONE) 20 MG tablet; Take 1 tablet (20 mg total) by mouth once daily. for 5 days  Dispense: 5 tablet; Refill: 0  -     triamcinolone acetonide 0.1% (KENALOG) 0.1 % cream; Apply topically 2 (two) times daily. for 10 days  Dispense: 45 g; Refill: 0          Patient Instructions       Self-Care for Skin Rashes     Pat your skin dry. Do not rub.     When your skin reacts to a substance your body is sensitive to, it can cause a rash. You can treat most rashes at home by keeping the skin clean and dry. Many rashes may get better on their own within 2 to 3 days. You may need medical attention if your rash itches, drains, or hurts, particularly if the rash is getting worse.  What can cause a skin rash?  · Sun poisoning, caused by too much exposure to the sun  · An irritant or allergic reaction to a certain type of food, plant, or chemical, such as  shellfish, poison ivy, and or cleaning products  · An infection caused by a fungus (ringworm), virus (chickenpox), or bacteria (strep)  · Bites or infestation caused by insects or pests, such as ticks, lice, or  mites  · Dry skin, which is often seen during the winter months and in older people  How can I control itching and skin damage?  · Take soothing lukewarm baths in a colloidal oatmeal product. You can buy this at the [employer].  · Do your best not to scratch. Clip fingernails short, especially in young children, to reduce skin damage if scratching does occur.  · Use moisturizing skin lotion instead of scratching your dry skin.  · Use sunscreen whenever going out into direct sun.  · Use only mild cleansing agents whenever possible.  · Wash with mild, nonirritating soap and warm water.  · Wear clothing that breathes, such as cotton shirts or canvas shoes.  · If fluid is seeping from the rash, cover it loosely with clean gauze to absorb the discharge.  · Many rashes are contagious. Prevent the rash from spreading to others by washing your hands often before or after touching others with any skin rash.  Use medicine  · Antihistamines such as diphenhydramine can help control itching. But use with caution because they can make you drowsy.  · Using over-the-counter hydrocortisone cream on small rashes may help reduce swelling and itching  · Most over-the-counter antifungal medicines can treat athletes foot and many other fungal infections of the skin.  Check with your healthcare provider  Call your healthcare provider if:  · You were told that you have a fungal infection on your skin to make sure you have the correct type of medicine.  · You have questions or concerns about medicines or their side effects.     Call 911  Call 911 if either of these occur:  · Your tongue or lips start to swell  · You have difficulty breathing      Call your healthcare provider  Call your healthcare provider if any of these occur:  · Temperature of more than 101.0°F (38.3°C), or as directed  · Sore throat, a cough, or unusual fatigue  · Red, oozy, or painful rash gets worse. These are signs of infection.  · Rash covers your face, genitals, or  most of your body  · Crusty sores or red rings that begin to spread  · You were exposed to someone who has a contagious rash, such as scabies or lice.  · Red bulls-eye rash with a white center (a sign of Lyme disease)  · You were told that you have resistant bacteria (MRSA) on your skin.   Date Last Reviewed: 5/12/2015  © 9719-5878 [employer]. [address] [place] [address], [place] [address]. All rights reserved. This information is not intended as a substitute for professional medical care. Always follow your healthcare professional's instructions.      Follow up with your doctor in a few days as needed.  Return to the urgent care or go to the ER if symptoms get worse.    Rick Sotelo MD

## 2018-11-02 NOTE — PATIENT INSTRUCTIONS

## 2022-12-16 ENCOUNTER — OFFICE VISIT (OUTPATIENT)
Dept: URGENT CARE | Facility: CLINIC | Age: 32
End: 2022-12-16
Payer: MEDICAID

## 2022-12-16 VITALS
TEMPERATURE: 99 F | BODY MASS INDEX: 47.09 KG/M2 | SYSTOLIC BLOOD PRESSURE: 128 MMHG | OXYGEN SATURATION: 97 % | DIASTOLIC BLOOD PRESSURE: 78 MMHG | HEART RATE: 115 BPM | WEIGHT: 293 LBS | RESPIRATION RATE: 20 BRPM | HEIGHT: 66 IN

## 2022-12-16 NOTE — PROGRESS NOTES
Subjective:       Patient ID: Lisa Martinez is a 32 y.o. female.    Vitals:  vitals were not taken for this visit.     Chief Complaint: Insect Bite    32 year old female presents today with an insect bite that happened yesterday. Bite is on right arm. States it hurts, burns, itches, redness, hard to touch. Unknown what she was bitten by. Treatments at home include Neosporin and Benadryl with no relief.     Insect Bite  This is a new problem. The current episode started yesterday. The problem occurs intermittently. The problem has been gradually worsening. Treatments tried: Benadryl and Neosporin.   ROS    Objective:      Physical Exam      Assessment:       No diagnosis found.      Plan:         There are no diagnoses linked to this encounter.                  ambulate

## 2022-12-20 ENCOUNTER — TELEPHONE (OUTPATIENT)
Dept: URGENT CARE | Facility: CLINIC | Age: 32
End: 2022-12-20
Payer: MEDICAID